# Patient Record
Sex: FEMALE | Race: BLACK OR AFRICAN AMERICAN
[De-identification: names, ages, dates, MRNs, and addresses within clinical notes are randomized per-mention and may not be internally consistent; named-entity substitution may affect disease eponyms.]

---

## 2020-04-08 ENCOUNTER — HOSPITAL ENCOUNTER (OUTPATIENT)
Dept: HOSPITAL 47 - RADUSWWP | Age: 50
Discharge: HOME | End: 2020-04-08
Attending: INTERNAL MEDICINE
Payer: COMMERCIAL

## 2020-04-08 DIAGNOSIS — N32.89: Primary | ICD-10-CM

## 2020-04-08 PROCEDURE — 76857 US EXAM PELVIC LIMITED: CPT

## 2020-04-08 NOTE — US
EXAMINATION TYPE: US bladder

 

DATE OF EXAM: 4/8/2020

 

COMPARISON: NONE

 

CLINICAL HISTORY: N39.41 Urge incontinence. possible bladder prolapse, urinary frequency 

 

EXAM MEASUREMENTS:

 

Post Void Residual Volume:  4.9 mL

 

 

 

Color Doppler performed to assess ureteral jets.

** Bilateral Jets seen:  no

 

** Normal Post Void Residual (less than 50ml):  yes

 

 

 

IMPRESSION:  Diminutive bladder size with wall thickening may be related to poor distention. Correlat
e clinically.